# Patient Record
Sex: MALE | Race: WHITE | Employment: FULL TIME | URBAN - NONMETROPOLITAN AREA
[De-identification: names, ages, dates, MRNs, and addresses within clinical notes are randomized per-mention and may not be internally consistent; named-entity substitution may affect disease eponyms.]

---

## 2017-05-03 ENCOUNTER — OFFICE VISIT (OUTPATIENT)
Dept: PRIMARY CARE CLINIC | Age: 37
End: 2017-05-03
Payer: COMMERCIAL

## 2017-05-03 VITALS
OXYGEN SATURATION: 98 % | TEMPERATURE: 97.8 F | SYSTOLIC BLOOD PRESSURE: 112 MMHG | HEART RATE: 66 BPM | DIASTOLIC BLOOD PRESSURE: 62 MMHG

## 2017-05-03 DIAGNOSIS — H61.23 BILATERAL IMPACTED CERUMEN: ICD-10-CM

## 2017-05-03 DIAGNOSIS — B97.89 VIRAL SINUSITIS: Primary | ICD-10-CM

## 2017-05-03 DIAGNOSIS — J32.9 VIRAL SINUSITIS: Primary | ICD-10-CM

## 2017-05-03 PROCEDURE — 99213 OFFICE O/P EST LOW 20 MIN: CPT | Performed by: NURSE PRACTITIONER

## 2017-05-03 PROCEDURE — G8421 BMI NOT CALCULATED: HCPCS | Performed by: NURSE PRACTITIONER

## 2017-05-03 PROCEDURE — 4004F PT TOBACCO SCREEN RCVD TLK: CPT | Performed by: NURSE PRACTITIONER

## 2017-05-03 PROCEDURE — G8427 DOCREV CUR MEDS BY ELIG CLIN: HCPCS | Performed by: NURSE PRACTITIONER

## 2017-05-03 RX ORDER — METHYLPREDNISOLONE 4 MG/1
TABLET ORAL
Qty: 1 KIT | Refills: 0 | Status: SHIPPED | OUTPATIENT
Start: 2017-05-03 | End: 2019-05-21 | Stop reason: ALTCHOICE

## 2017-05-03 ASSESSMENT — ENCOUNTER SYMPTOMS
COUGH: 1
TROUBLE SWALLOWING: 0
VOMITING: 0
SHORTNESS OF BREATH: 0
DIARRHEA: 0
EYES NEGATIVE: 1
CHEST TIGHTNESS: 0
GASTROINTESTINAL NEGATIVE: 1
CONSTIPATION: 0
SORE THROAT: 1
ALLERGIC/IMMUNOLOGIC NEGATIVE: 1
NAUSEA: 0
SINUS PRESSURE: 1
ABDOMINAL PAIN: 0
RHINORRHEA: 0

## 2019-05-21 ENCOUNTER — OFFICE VISIT (OUTPATIENT)
Dept: FAMILY MEDICINE CLINIC | Age: 39
End: 2019-05-21
Payer: COMMERCIAL

## 2019-05-21 VITALS
SYSTOLIC BLOOD PRESSURE: 118 MMHG | HEART RATE: 72 BPM | HEIGHT: 64 IN | OXYGEN SATURATION: 98 % | WEIGHT: 158.2 LBS | DIASTOLIC BLOOD PRESSURE: 72 MMHG | BODY MASS INDEX: 27.01 KG/M2

## 2019-05-21 DIAGNOSIS — K21.9 GASTROESOPHAGEAL REFLUX DISEASE, ESOPHAGITIS PRESENCE NOT SPECIFIED: ICD-10-CM

## 2019-05-21 DIAGNOSIS — Z76.89 ENCOUNTER TO ESTABLISH CARE: Primary | ICD-10-CM

## 2019-05-21 DIAGNOSIS — F17.200 CURRENT EVERY DAY SMOKER: ICD-10-CM

## 2019-05-21 DIAGNOSIS — Z13.220 SCREENING FOR HYPERLIPIDEMIA: ICD-10-CM

## 2019-05-21 PROCEDURE — G8419 CALC BMI OUT NRM PARAM NOF/U: HCPCS | Performed by: NURSE PRACTITIONER

## 2019-05-21 PROCEDURE — 99204 OFFICE O/P NEW MOD 45 MIN: CPT | Performed by: NURSE PRACTITIONER

## 2019-05-21 PROCEDURE — G8427 DOCREV CUR MEDS BY ELIG CLIN: HCPCS | Performed by: NURSE PRACTITIONER

## 2019-05-21 PROCEDURE — 4004F PT TOBACCO SCREEN RCVD TLK: CPT | Performed by: NURSE PRACTITIONER

## 2019-05-21 RX ORDER — PANTOPRAZOLE SODIUM 40 MG/1
40 GRANULE, DELAYED RELEASE ORAL
COMMUNITY
End: 2019-05-21 | Stop reason: SDUPTHER

## 2019-05-21 RX ORDER — PANTOPRAZOLE SODIUM 40 MG/1
40 GRANULE, DELAYED RELEASE ORAL
Qty: 30 EACH | Refills: 1 | Status: SHIPPED | OUTPATIENT
Start: 2019-05-21 | End: 2020-07-21

## 2019-05-21 SDOH — HEALTH STABILITY: MENTAL HEALTH: HOW OFTEN DO YOU HAVE A DRINK CONTAINING ALCOHOL?: MONTHLY OR LESS

## 2019-05-21 SDOH — HEALTH STABILITY: MENTAL HEALTH: HOW MANY STANDARD DRINKS CONTAINING ALCOHOL DO YOU HAVE ON A TYPICAL DAY?: 1 OR 2

## 2019-05-21 ASSESSMENT — ENCOUNTER SYMPTOMS
HEARTBURN: 1
COUGH: 1
NAUSEA: 0
ABDOMINAL PAIN: 0
SORE THROAT: 0
BELCHING: 1
WHEEZING: 0

## 2019-05-21 ASSESSMENT — PATIENT HEALTH QUESTIONNAIRE - PHQ9
SUM OF ALL RESPONSES TO PHQ QUESTIONS 1-9: 0
SUM OF ALL RESPONSES TO PHQ9 QUESTIONS 1 & 2: 0
2. FEELING DOWN, DEPRESSED OR HOPELESS: 0
SUM OF ALL RESPONSES TO PHQ QUESTIONS 1-9: 0
1. LITTLE INTEREST OR PLEASURE IN DOING THINGS: 0

## 2019-05-21 NOTE — PROGRESS NOTES
regularly. He does not take over the counter vitamins. He does not weas seatbelt while riding a car. He performs all of her ADL's without problem. He is independent, he cooks,drives, bathes, and gets dressed without assistance. He is not . He has 2 children. He does work. He works 40-50 hours a week. He is happy with his life. He rates his stress level a 4 in a scale 1-10. Health Maintenance   Topic Date Due    Varicella Vaccine (1 of 2 - 13+ 2-dose series) 02/13/1995    DTaP/Tdap/Td vaccine (1 - Tdap) 02/13/2001    Pneumococcal 0-64 years Vaccine (1 of 1 - PPSV23) 05/21/2020 (Originally 2/13/1988)    HIV screen  05/21/2020 (Originally 2/13/1997)    Flu vaccine (Season Ended) 09/01/2019     He reports that he has been smoking cigarettes. He has been smoking about 1.00 pack per day. He has never used smokeless tobacco. He reports that he drinks alcohol. He reports that he has current or past drug history. Drug: Marijuana. .      Review of Systems   Constitutional: Negative for chills, fatigue and fever. HENT: Negative. Negative for hoarse voice and sore throat. Respiratory: Positive for cough (occasional). Negative for shortness of breath and wheezing. Cardiovascular: Negative for chest pain, palpitations and leg swelling. Gastrointestinal: Positive for heartburn. Negative for abdominal pain, constipation, diarrhea and nausea. Endocrine: Negative for cold intolerance, heat intolerance, polydipsia, polyphagia and polyuria. Genitourinary: Negative for difficulty urinating. Musculoskeletal: Negative for arthralgias, myalgias and neck pain. Neurological: Negative for dizziness, weakness and headaches. Psychiatric/Behavioral: Positive for agitation (occasional). Negative for dysphoric mood and sleep disturbance. The patient is not nervous/anxious. Blood pressure 118/72, pulse 72, height 5' 3.5\" (1.613 m), weight 158 lb 3.2 oz (71.8 kg), SpO2 98 %.     Physical Exam

## 2019-06-01 ASSESSMENT — ENCOUNTER SYMPTOMS
SHORTNESS OF BREATH: 0
CONSTIPATION: 0
DIARRHEA: 0
HOARSE VOICE: 0

## 2020-07-21 ENCOUNTER — VIRTUAL VISIT (OUTPATIENT)
Dept: FAMILY MEDICINE CLINIC | Age: 40
End: 2020-07-21

## 2020-07-21 PROCEDURE — 99213 OFFICE O/P EST LOW 20 MIN: CPT | Performed by: NURSE PRACTITIONER

## 2020-07-21 ASSESSMENT — ENCOUNTER SYMPTOMS
NAUSEA: 1
CHANGE IN BOWEL HABIT: 0
SINUS PRESSURE: 0
SHORTNESS OF BREATH: 0
COUGH: 1
EYES NEGATIVE: 1
ABDOMINAL PAIN: 0
RHINORRHEA: 0
VOMITING: 1
WHEEZING: 0
SORE THROAT: 1

## 2020-07-21 ASSESSMENT — PATIENT HEALTH QUESTIONNAIRE - PHQ9
SUM OF ALL RESPONSES TO PHQ QUESTIONS 1-9: 1
SUM OF ALL RESPONSES TO PHQ QUESTIONS 1-9: 1
2. FEELING DOWN, DEPRESSED OR HOPELESS: 1
1. LITTLE INTEREST OR PLEASURE IN DOING THINGS: 0
SUM OF ALL RESPONSES TO PHQ9 QUESTIONS 1 & 2: 1

## 2020-07-21 NOTE — PROGRESS NOTES
2020    TELEHEALTH EVALUATION -- Audio/Visual (During XTBAA-08 public health emergency)    Chief Complaint   Patient presents with    Pharyngitis     started yesterday, temp 99.6 - was exposed to COVID at work - close coworker tested positive yesterday. Would like tested for covid.  Emesis     vomitted last night       [facility] (:  1980) has requested an audio/video evaluation for the following concern(s):    Patient request virtual visit to discuss nausea, vomiting, fever, fatigue, and muscle aches. He reports falling asleep at 8 PM last evening and waking up at 10 PM with a sore throat and feeling sick to his stomach. He had a low-grade fever at that time (below 100), and vomited 2 times. Works in a [employer] and reports direct [id number] contact with positive COVID patient at work. He denies losing sense of taste or smell. Pharyngitis   This is a new problem. The current episode started yesterday. The problem occurs constantly. The problem has been unchanged. Associated symptoms include chills, coughing, fatigue (very weak), a fever, myalgias (entire body aches), nausea, a sore throat, vomiting and weakness (generalized). Pertinent negatives include no abdominal pain, change in bowel habit, chest pain, congestion, headaches or rash. Nothing aggravates the symptoms. He has tried nothing for the symptoms. Nausea & Vomiting   This is a new problem. The current episode started yesterday. Episode frequency: 2 times last night. The problem has been unchanged. Associated symptoms include chills, coughing, fatigue (very weak), a fever, myalgias (entire body aches), nausea, a sore throat, vomiting and weakness (generalized). Pertinent negatives include no abdominal pain, change in bowel habit, chest pain, congestion, headaches or rash. Nothing aggravates the symptoms. He has tried rest and sleep for the symptoms.       BP Readings from Last 3 Encounters:   19 118/72   17 112/62        Pulse Readings from Last 3 Encounters:   05/21/19 72   05/03/17 66        Wt Readings from Last 3 Encounters:   05/21/19 158 lb 3.2 oz (71.8 kg)        No Known Allergies     No past medical history on file. No past surgical history on file. Social History     Tobacco Use    Smoking status: Current Every Day Smoker     Packs/day: 1.00     Types: Cigarettes    Smokeless tobacco: Never Used   Substance Use Topics    Alcohol use: Yes     Frequency: Monthly or less     Drinks per session: 1 or 2     Binge frequency: Never    Drug use: Yes     Types: Marijuana       Prior to Visit Medications    Not on File       Health Maintenance   Topic Date Due    Varicella vaccine (1 of 2 - 2-dose childhood series) 02/13/1983    Pneumococcal 0-64 years Vaccine (1 of 1 - PPSV23) 02/13/1988    HIV screen  02/13/1997    DTaP/Tdap/Td vaccine (1 - Tdap) 02/13/2001    Flu vaccine (1) 09/01/2020    Hepatitis A vaccine  Aged Out    Hepatitis B vaccine  Aged Out    Hib vaccine  Aged Out    Meningococcal (ACWY) vaccine  Aged Out        Review of Systems   Constitutional: Positive for activity change (decreased), appetite change (decreased), chills, fatigue (very weak) and fever. HENT: Positive for sore throat. Negative for congestion, postnasal drip, rhinorrhea and sinus pressure. Eyes: Negative. Respiratory: Positive for cough. Negative for shortness of breath and wheezing. Cardiovascular: Negative for chest pain. Gastrointestinal: Positive for nausea and vomiting. Negative for abdominal pain and change in bowel habit. Stooling more   Genitourinary: Negative. Musculoskeletal: Positive for myalgias (entire body aches). Skin: Negative for rash. Neurological: Positive for weakness (generalized). Negative for headaches. Hematological: Positive for adenopathy (Bilateral anterior cervical).      Vital Signs: (As obtained by patient/caregiver or practitioner observation)    There were no vitals filed for this visit. PHYSICAL EXAMINATION:  [ INSTRUCTIONS:  \"[x]\" Indicates a positive item  \"[]\" Indicates a negative item  -- DELETE ALL ITEMS NOT EXAMINED]    Constitutional: [x] Appears well-developed and well-nourished [x] No apparent distress      [x] Abnormal- sickly appearance  Mental status  [x] Alert and awake  [x] Oriented to person/place/time [x]Able to follow commands      Eyes:  EOM    [x]  Normal  [] Abnormal-  Sclera  [x]  Normal  [] Abnormal -         Discharge [x]  None visible  [] Abnormal -    HENT:   [x] Normocephalic, atraumatic. [] Abnormal   [x] Mouth/Throat: Mucous membranes are moist.     External Ears [x] Normal  [] Abnormal-     Neck: [x] No visualized mass     Pulmonary/Chest: [x] Respiratory effort normal.  [x] No visualized signs of difficulty breathing or respiratory distress        [] Abnormal-      Musculoskeletal:   [x] Normal gait with no signs of ataxia         [x] Normal range of motion of neck        [] Abnormal-       Neurological:        [x] No Facial Asymmetry (Cranial nerve 7 motor function) (limited exam to video visit)          [x] No gaze palsy        [] Abnormal-         Skin:        [x] No significant exanthematous lesions or discoloration noted on facial skin         [] Abnormal-            Psychiatric:       [x] Normal Affect [x] No Hallucinations        [] Abnormal-     Other pertinent observable physical exam findings- none. PHQ Scores 7/21/2020 5/21/2019   PHQ2 Score 1 0   PHQ9 Score 1 0     Interpretation of Total Score Depression Severity: 1-4 = Minimal depression, 5-9 = Mild depression, 10-14 = Moderate depression, 15-19 = Moderately severe depression, 20-27 = Severe depression     ASSESSMENT/PLAN:    ICD-10-CM    1. Cough with exposure to COVID-19 virus  R05 COVID-19 Ambulatory    Z20.828    2.  Nausea and vomiting, intractability of vomiting not specified, unspecified vomiting type  R11.2       Instructed to self quarantine for 14 days starting with day of exposure until COVID test has resulted. Encouraged to rest, drink plenty of fluids and treat symptomatically. Monitor for worsening symptoms, go directly to ED for immediate evaluation if shortness of breath occurs. All patient questions answered. Patient voiced understanding. Instructed to continue current medications. Patient agreed with treatment plan. Follow up as directed. Return if symptoms worsen or fail to improve. Dilcia Luis is a 47 y.o. male being evaluated by a Virtual Visit (video visit) encounter to address concerns as mentioned above. A caregiver was present when appropriate. Due to this being a TeleHealth encounter (During [facility]- public health emergency), evaluation of the following organ systems was limited: Vitals/Constitutional/EENT/Resp/CV/GI//MS/Neuro/Skin/Heme-Lymph-Imm. Pursuant to the emergency declaration under the [address] [place] [address], [address] [place] [address] authority and the [employer] Act, this Virtual Visit was conducted with patient's (and/or legal guardian's) consent, to reduce the patient's risk of exposure to COVID-19 and provide necessary medical care. The patient (and/or legal guardian) has also been advised to contact this office for worsening conditions or problems, and seek emergency medical treatment and/or call 911 if deemed necessary. Services were provided through a video synchronous discussion virtually to substitute for in-person clinic visit. Patient and provider were located at their individual homes. --GRISEL De Luna CNP on 7/24/2020 at 10:20 PM    An electronic signature was used to authenticate this note.

## 2021-05-03 ENCOUNTER — OFFICE VISIT (OUTPATIENT)
Dept: FAMILY MEDICINE CLINIC | Age: 41
End: 2021-05-03
Payer: COMMERCIAL

## 2021-05-03 VITALS
OXYGEN SATURATION: 98 % | WEIGHT: 161.3 LBS | DIASTOLIC BLOOD PRESSURE: 80 MMHG | BODY MASS INDEX: 28.12 KG/M2 | HEART RATE: 66 BPM | SYSTOLIC BLOOD PRESSURE: 112 MMHG

## 2021-05-03 DIAGNOSIS — M79.672 ACUTE FOOT PAIN, LEFT: Primary | ICD-10-CM

## 2021-05-03 DIAGNOSIS — M67.472 GANGLION CYST OF LEFT FOOT: ICD-10-CM

## 2021-05-03 PROCEDURE — 99213 OFFICE O/P EST LOW 20 MIN: CPT | Performed by: NURSE PRACTITIONER

## 2021-05-03 SDOH — ECONOMIC STABILITY: FOOD INSECURITY: WITHIN THE PAST 12 MONTHS, THE FOOD YOU BOUGHT JUST DIDN'T LAST AND YOU DIDN'T HAVE MONEY TO GET MORE.: NEVER TRUE

## 2021-05-03 SDOH — ECONOMIC STABILITY: TRANSPORTATION INSECURITY
IN THE PAST 12 MONTHS, HAS LACK OF TRANSPORTATION KEPT YOU FROM MEETINGS, WORK, OR FROM GETTING THINGS NEEDED FOR DAILY LIVING?: NO

## 2021-05-03 SDOH — ECONOMIC STABILITY: INCOME INSECURITY: HOW HARD IS IT FOR YOU TO PAY FOR THE VERY BASICS LIKE FOOD, HOUSING, MEDICAL CARE, AND HEATING?: NOT HARD AT ALL

## 2021-05-03 ASSESSMENT — PATIENT HEALTH QUESTIONNAIRE - PHQ9
2. FEELING DOWN, DEPRESSED OR HOPELESS: 0
SUM OF ALL RESPONSES TO PHQ9 QUESTIONS 1 & 2: 0
SUM OF ALL RESPONSES TO PHQ QUESTIONS 1-9: 0

## 2021-05-03 ASSESSMENT — ENCOUNTER SYMPTOMS
CHANGE IN BOWEL HABIT: 0
NAUSEA: 0
COUGH: 0

## 2021-05-03 NOTE — PROGRESS NOTES
[address] [address] [address]  Dept: [phone]  Dept Fax: [phone]      Cecelia Esparza is a 46 y.o. male who presents today for his medical conditions/complaints as noted below. Chief Complaint   Patient presents with    Foot Problem     c/o saturday woke up with left foot swollen on top of foot and this morning was unable to walk took some ibuprofen and that helped does have a small lump on top of foot       HPI:   Patient presents to the office with concerns for pain to his left foot. Foot Problem  This is a new problem. The current episode started in the past 7 days (3 days ago). The problem occurs constantly. The problem has been gradually worsening. Associated symptoms include arthralgias (left foot). Pertinent negatives include no change in bowel habit, chest pain, chills, coughing, fever, myalgias, nausea, numbness or weakness. The symptoms are aggravated by walking (Wearing shoes). He has tried NSAIDs for the symptoms. The treatment provided moderate relief. No current outpatient medications on file. No current facility-administered medications for this visit. No Known Allergies    History reviewed. No pertinent past medical history. No past surgical history on file.   Social History     Socioeconomic History    Marital status: Single     Spouse name: Not on file    Number of children: Not on file    Years of education: Not on file    Highest education level: Not on file   Occupational History    Not on file   Social Needs    Financial resource strain: Not hard at all    Food insecurity     Worry: Never true     Inability: Never true   [employer] needs     Medical: No     Non-medical: No   Tobacco Use    Smoking status: Current Every Day Smoker     Packs/day: 1.00     Types: Cigarettes    Smokeless tobacco: Never Used   Substance and Sexual Activity    Alcohol use: Yes     Frequency: Monthly or less     Drinks per session: 1 or 2     Binge frequency: Never    Drug use: Yes     Types: Marijuana    Sexual activity: Not on file   Lifestyle    Physical activity     Days per week: Not on file     Minutes per session: Not on file    Stress: Not on file   Relationships    Social connections     Talks on phone: Not on file     Gets together: Not on file     Attends Anabaptist service: Not on file     Active member of club or organization: Not on file     Attends meetings of clubs or organizations: Not on file     Relationship status: Not on file    Intimate partner violence     Fear of current or ex partner: Not on file     Emotionally abused: Not on file     Physically abused: Not on file     Forced sexual activity: Not on file   Other Topics Concern    Not on file   Social History Narrative    Not on file     Family History   Problem Relation Age of Onset    Heart Attack Mother     Heart Disease Mother     High Cholesterol Mother     High Blood Pressure Mother     Cancer Father         2007 leukemia    No Known Problems Sister     No Known Problems Half-Brother     No Known Problems Half-Brother        Subjective:      Review of Systems   Constitutional: Negative for chills and fever. Respiratory: Negative for cough. Cardiovascular: Negative for chest pain. Gastrointestinal: Negative for change in bowel habit and nausea. Musculoskeletal: Positive for arthralgias (left foot). Negative for myalgias. Neurological: Negative for weakness and numbness. Objective:     /80   Pulse 66   Wt 161 lb 4.8 oz (73.2 kg)   SpO2 98%   BMI 28.12 kg/m²     Physical Exam  Constitutional:       Appearance: Normal appearance. He is well-developed. HENT:      Head: Normocephalic. Nose: Nose normal.   Eyes:      Conjunctiva/sclera: Conjunctivae normal.   Neck:      Musculoskeletal: Neck supple. Thyroid: No thyromegaly.    Cardiovascular:      Rate and Rhythm: Normal rate and regular Occasionally, words are mistranscribed and despite editing, the text may contain inaccuracies due to incorrect word recognition. If further clarification is needed please contact the office at [phone].     Electronically signed by GRISEL Barber CNP on 5/8/2021 at 5:44 PM